# Patient Record
Sex: FEMALE | Race: WHITE | URBAN - METROPOLITAN AREA
[De-identification: names, ages, dates, MRNs, and addresses within clinical notes are randomized per-mention and may not be internally consistent; named-entity substitution may affect disease eponyms.]

---

## 2019-09-30 ENCOUNTER — APPOINTMENT (RX ONLY)
Dept: URBAN - METROPOLITAN AREA CLINIC 310 | Facility: CLINIC | Age: 39
Setting detail: DERMATOLOGY
End: 2019-09-30

## 2019-09-30 DIAGNOSIS — L259 CONTACT DERMATITIS AND OTHER ECZEMA, UNSPECIFIED CAUSE: ICD-10-CM

## 2019-09-30 PROBLEM — L23.9 ALLERGIC CONTACT DERMATITIS, UNSPECIFIED CAUSE: Status: ACTIVE | Noted: 2019-09-30

## 2019-09-30 PROCEDURE — ? PATCH TESTING

## 2019-09-30 PROCEDURE — 95044 PATCH/APPLICATION TESTS: CPT

## 2019-09-30 ASSESSMENT — SEVERITY ASSESSMENT: SEVERITY: MILD

## 2019-09-30 NOTE — PROCEDURE: PATCH TESTING
Post-Care Instructions: I reviewed with the patient in detail post-care instructions. Patient should not sweat, pick at, or get the patches wet for 48 hours. Reminded to wear old or dark clothing on Wednesday.
Consent: Verbal consent obtained, risks reviewed including but not limited to rash, itching, allergic reaction, systemic rash, remote possiblity of anaphylaxis to allergen.
Detail Level: None
Number Of Patches (Maximum Allowable Per Dos By Cms Is 90): 80

## 2019-09-30 NOTE — HPI: RASH (ALLERGIC CONTACT DERMATITIS)
How Severe Is Your Rash?: mild
Response To Previous Treatment?: topical treatments are ineffective
Is This A New Presentation, Or A Follow-Up?: Rash
Additional History: Pt was seen at [facility] for allergic contact dermatitis with Dr. Osborne and they referred her here for patch testing

## 2019-10-02 ENCOUNTER — APPOINTMENT (RX ONLY)
Dept: URBAN - METROPOLITAN AREA CLINIC 310 | Facility: CLINIC | Age: 39
Setting detail: DERMATOLOGY
End: 2019-10-02

## 2019-10-02 DIAGNOSIS — L259 CONTACT DERMATITIS AND OTHER ECZEMA, UNSPECIFIED CAUSE: ICD-10-CM

## 2019-10-02 PROBLEM — L23.9 ALLERGIC CONTACT DERMATITIS, UNSPECIFIED CAUSE: Status: ACTIVE | Noted: 2019-10-02

## 2019-10-02 PROCEDURE — ? OTHER

## 2019-10-02 PROCEDURE — ? NORTH AMERICAN 80 PATCH TEST READING

## 2019-10-02 PROCEDURE — ? PATCH TEST REMOVAL

## 2019-10-02 PROCEDURE — 99212 OFFICE O/P EST SF 10 MIN: CPT

## 2019-10-02 NOTE — PROCEDURE: OTHER
Other (Free Text): #	Allergen	Result\\n1	nickel sulfate hexahydrate	-\\n2	balsam of peru (myroxylon pereirae resin)	-\\n3	fragrance mix	+/-\\n4	quaternium 15	-\\n5	neomycin sulfate	-\\n6	budesonide	-\\n7	cobalt (II) chloride hexahydrate	-\\n8	g-ycva-wcisicaglrs formaldehyde resin	-\\n9	4-phenylenediamine base	-\\n10	potassium dichromate	-\\n11	carba mix	-\\n12	thiuram mix 	-\\n13	diazolidinyl urea (Germall® II)	-\\n14	paraben mix 	-\\n15	black rubber mix - PPD	-\\n16	imidazolidinyl urea (Germall® 115)	-\\n17	mercapto mix	-\\n18	tixocortol-21-pivalate	-\\n19	2-mercaptobenzothiazole	-\\n20	colophony	-\\n21	bisphenol A epoxy resin	-\\n22	ethylenediamine dihydrochloride	-\\n23	amerchol L101	-\\n24	benzocaine	-\\n25	bacitracin	-\\n26	DMDM hydantoin	-\\n27	Benzoic acid	-\\n28	Ethylhexyl glycerol	-\\n29	2-bromo-2-nitropropane-1,3-diol (bronopol™)	-\\n30	lidocaine-HCl	-\\n31	gold sodium thiosulfate	-\\n32	methyldibromo glutaronitrile (MDBGN)	-\\n33	disperse blue mix 	-\\n34	hydrocortisone-17-butyrate	-\\n35	fragrance mix II	1+\\n36	iodopropynyl butylcarbamate	-\\n37	mixed dialkyl thioureas	-\\n38	2-hydroxyethyl methacrylate (HEMA)	-\\n39	decyl glucoside	-\\n40	methyl methacrylate	-\\n41	cinnamic aldehyde	-\\n42	ethyl acrylate	-\\n43	tea tree oil, oxidized	1+\\n44	4-chloro-3,5-xylenol (PCMX)	-\\n45	propolis	-\\n46	2-hydroxy-4-methoxybenzophenone (oxybenzone)	-\\n47	tosylamide formaldehyde resin	-\\n48	sesquiterpenelactone mix	-\\n49	coconut diethanolamide (cocamide POLLY)	-\\n50	4-chloro-3-cresol ([facility])	-\\n51	benzalkonium chloride	-\\n52	benzophenone-4	-\\n53	Polymyxin B sulfate	-\\n54	sorbic acid	-\\n55	cananga odorata (suzan mares)	1+\\n56	compositae mix	-\\n57	ethyleneurea, melamine formaldehyde mix	-\\n58	sorbitan sesquioleate	-\\n59	1,3-diphenylguanidine (DPG)	-\\n60	cetylstearylalcohol	-\\n61	Sodium benzoate	-\\n62	triamcinolone acetonide	-\\n63	clobetasol-17-propionate	-\\n64	dl alpha tocopherol	-\\n65	ethyl cyanoacrylate	-\\n66	phenoxyethanol	-\\n67	disperse orange-3	-\\n68	Lavender oil (lavandula angustifolia oil)	+/-\\n69	butylhydroxytoluene (BHT)	-\\n70	2-ethylhexyl-4-methoxycinnamate (octinoxate)	-\\n71	benzyl alcohol	-\\n72	formaldehyde	-\\n73	methylchloroisothiazolinone/methylisothiazolinone	-\\n74	methylisothiazolinone	2+\\n75	cocamidopropyl betaine	-\\n76	dimethylaminopropylamine (DMAPA)	-\\n77	oleamidopropyl dimethylamine	-\\n78	propylene glycol	-\\n79	amidoamine (stearamidopropyl dimethylamine)	-\\n80	chlorhexidine digluconate	-\\n\\nSeveral early positives as noted above. We will await her final reading on Friday to discuss. Instructions for care of her patch test sites were given. She is to f/u in 2 days. \\n Other (Free Text): #	Allergen	Result\\n1	nickel sulfate hexahydrate	-\\n2	balsam of peru (myroxylon pereirae resin)	-\\n3	fragrance mix	+/-\\n4	quaternium 15	-\\n5	neomycin sulfate	-\\n6	budesonide	-\\n7	cobalt (II) chloride hexahydrate	-\\n8	r-kssy-pkiyuzrdiwa formaldehyde resin	-\\n9	4-phenylenediamine base	-\\n10	potassium dichromate	-\\n11	carba mix	-\\n12	thiuram mix 	-\\n13	diazolidinyl urea (Germall® II)	-\\n14	paraben mix 	-\\n15	black rubber mix - PPD	-\\n16	imidazolidinyl urea (Germall® 115)	-\\n17	mercapto mix	-\\n18	tixocortol-21-pivalate	-\\n19	2-mercaptobenzothiazole	-\\n20	colophony	-\\n21	bisphenol A epoxy resin	-\\n22	ethylenediamine dihydrochloride	-\\n23	amerchol L101	-\\n24	benzocaine	-\\n25	bacitracin	-\\n26	DMDM hydantoin	-\\n27	Benzoic acid	-\\n28	Ethylhexyl glycerol	-\\n29	2-bromo-2-nitropropane-1,3-diol (bronopol™)	-\\n30	lidocaine-HCl	-\\n31	gold sodium thiosulfate	-\\n32	methyldibromo glutaronitrile (MDBGN)	-\\n33	disperse blue mix 	-\\n34	hydrocortisone-17-butyrate	-\\n35	fragrance mix II	1+\\n36	iodopropynyl butylcarbamate	-\\n37	mixed dialkyl thioureas	-\\n38	2-hydroxyethyl methacrylate (HEMA)	-\\n39	decyl glucoside	-\\n40	methyl methacrylate	-\\n41	cinnamic aldehyde	-\\n42	ethyl acrylate	-\\n43	tea tree oil, oxidized	1+\\n44	4-chloro-3,5-xylenol (PCMX)	-\\n45	propolis	-\\n46	2-hydroxy-4-methoxybenzophenone (oxybenzone)	-\\n47	tosylamide formaldehyde resin	-\\n48	sesquiterpenelactone mix	-\\n49	coconut diethanolamide (cocamide POLLY)	-\\n50	4-chloro-3-cresol ([facility])	-\\n51	benzalkonium chloride	-\\n52	benzophenone-4	-\\n53	Polymyxin B sulfate	-\\n54	sorbic acid	-\\n55	cananga odorata (suzan mares)	1+\\n56	compositae mix	-\\n57	ethyleneurea, melamine formaldehyde mix	-\\n58	sorbitan sesquioleate	-\\n59	1,3-diphenylguanidine (DPG)	-\\n60	cetylstearylalcohol	-\\n61	Sodium benzoate	-\\n62	triamcinolone acetonide	-\\n63	clobetasol-17-propionate	-\\n64	dl alpha tocopherol	-\\n65	ethyl cyanoacrylate	-\\n66	phenoxyethanol	-\\n67	disperse orange-3	-\\n68	Lavender oil (lavandula angustifolia oil)	+/-\\n69	butylhydroxytoluene (BHT)	-\\n70	2-ethylhexyl-4-methoxycinnamate (octinoxate)	-\\n71	benzyl alcohol	-\\n72	formaldehyde	-\\n73	methylchloroisothiazolinone/methylisothiazolinone	-\\n74	methylisothiazolinone	2+\\n75	cocamidopropyl betaine	-\\n76	dimethylaminopropylamine (DMAPA)	-\\n77	oleamidopropyl dimethylamine	-\\n78	propylene glycol	-\\n79	amidoamine (stearamidopropyl dimethylamine)	-\\n80	chlorhexidine digluconate	-\\n\\nSeveral early positives as noted above. We will await her final reading on Friday to discuss. Instructions for care of her patch test sites were given. She is to f/u in 2 days. \\n

## 2019-10-02 NOTE — PROCEDURE: [employer] 80 PATCH TEST READING
Allergen 80 Reaction: no reaction
Show Allergen Counseling In The Note?: No
Show Negative Results In The Note?: Yes
Detail Level: Zone
Number Of Patches Read: 80
What Reading Time Point?: 48 hour

## 2019-10-02 NOTE — PROCEDURE: PATCH TEST REMOVAL
Detail Level: None
Patch Test Removal Text: The patch test material was removed from the back today. The first patch test reading was performed today.

## 2019-10-04 ENCOUNTER — APPOINTMENT (RX ONLY)
Dept: URBAN - METROPOLITAN AREA CLINIC 310 | Facility: CLINIC | Age: 39
Setting detail: DERMATOLOGY
End: 2019-10-04

## 2019-10-04 DIAGNOSIS — L259 CONTACT DERMATITIS AND OTHER ECZEMA, UNSPECIFIED CAUSE: ICD-10-CM

## 2019-10-04 PROBLEM — L23.9 ALLERGIC CONTACT DERMATITIS, UNSPECIFIED CAUSE: Status: ACTIVE | Noted: 2019-10-04

## 2019-10-04 PROCEDURE — 99213 OFFICE O/P EST LOW 20 MIN: CPT

## 2019-10-04 PROCEDURE — ? OTHER

## 2019-10-04 PROCEDURE — ? NORTH AMERICAN 80 PATCH TEST READING

## 2019-10-04 NOTE — PROCEDURE: [employer] 80 PATCH TEST READING
Allergen 9 Reaction: no reaction
What Reading Time Point?: 96 hour
Detail Level: Zone
Number Of Patches Read: 80
Show Negative Results In The Note?: Yes
Show Allergen Counseling In The Note?: No

## 2019-10-04 NOTE — PROCEDURE: OTHER
Other (Free Text): #	Allergen	Result\\n1	nickel sulfate hexahydrate	-\\n2	balsam of peru (myroxylon pereirae resin)	-\\n3	fragrance mix	+/-\\n4	quaternium 15	-\\n5	neomycin sulfate	-\\n6	budesonide	-\\n7	cobalt (II) chloride hexahydrate	-\\n8	y-ixxr-abkycqapjkp formaldehyde resin	-\\n9	4-phenylenediamine base	-\\n10	potassium dichromate	-\\n11	carba mix	-\\n12	thiuram mix 	-\\n13	diazolidinyl urea (Germall® II)	-\\n14	paraben mix 	-\\n15	black rubber mix - PPD	-\\n16	imidazolidinyl urea (Germall® 115)	-\\n17	mercapto mix	-\\n18	tixocortol-21-pivalate	-\\n19	2-mercaptobenzothiazole	-\\n20	colophony	-\\n21	bisphenol A epoxy resin	-\\n22	ethylenediamine dihydrochloride	-\\n23	amerchol L101	-\\n24	benzocaine	-\\n25	bacitracin	-\\n26	DMDM hydantoin	-\\n27	Benzoic acid	-\\n28	Ethylhexyl glycerol	-\\n29	2-bromo-2-nitropropane-1,3-diol (bronopol™)	-\\n30	lidocaine-HCl	-\\n31	gold sodium thiosulfate	-\\n32	methyldibromo glutaronitrile (MDBGN)	-\\n33	disperse blue mix 	-\\n34	hydrocortisone-17-butyrate	-\\n35	fragrance mix II	2+\\n36	iodopropynyl butylcarbamate	-\\n37	mixed dialkyl thioureas	-\\n38	2-hydroxyethyl methacrylate (HEMA)	-\\n39	decyl glucoside	-\\n40	methyl methacrylate	-\\n41	cinnamic aldehyde	-\\n42	ethyl acrylate	-\\n43	tea tree oil, oxidized	2+\\n44	4-chloro-3,5-xylenol (PCMX)	-\\n45	propolis	-\\n46	2-hydroxy-4-methoxybenzophenone (oxybenzone)	-\\n47	tosylamide formaldehyde resin	-\\n48	sesquiterpenelactone mix	-\\n49	coconut diethanolamide (cocamide POLLY)	-\\n50	4-chloro-3-cresol ([facility])	-\\n51	benzalkonium chloride	-\\n52	benzophenone-4	-\\n53	Polymyxin B sulfate	-\\n54	sorbic acid	-\\n55	cananga odorata (ylang ylang)	1+\\n56	compositae mix	-\\n57	ethyleneurea, melamine formaldehyde mix	-\\n58	sorbitan sesquioleate	-\\n59	1,3-diphenylguanidine (DPG)	-\\n60	cetylstearylalcohol	-\\n61	Sodium benzoate	-\\n62	triamcinolone acetonide	-\\n63	clobetasol-17-propionate	-\\n64	dl alpha tocopherol	-\\n65	ethyl cyanoacrylate	-\\n66	phenoxyethanol	-\\n67	disperse orange-3	-\\n68	Lavender oil (lavandula angustifolia oil)	1+\\n69	butylhydroxytoluene (BHT)	-\\n70	2-ethylhexyl-4-methoxycinnamate (octinoxate)	-\\n71	benzyl alcohol	-\\n72	formaldehyde	-\\n73	methylchloroisothiazolinone/methylisothiazolinone	1+\\n74	methylisothiazolinone	3+\\n75	cocamidopropyl betaine	-\\n76	dimethylaminopropylamine (DMAPA)	-\\n77	oleamidopropyl dimethylamine	-\\n78	propylene glycol	-\\n79	amidoamine (stearamidopropyl dimethylamine)	-\\n80	chlorhexidine digluconate	-\\n\\nShe is reacting to several fragrances including frag mix I and II, tea tree oil, ylang ylang oil, lavender oil. She does use some of the essential oils. Also she is reacting to MCI/MI. I am suspicious of all of these. They were discussed and handouts were given. She will start avoiding these. She is to f/u w/ Dr. Osborne in 8-12 weeks. Call me sooner prn questions or concerns. \\n Other (Free Text): #	Allergen	Result\\n1	nickel sulfate hexahydrate	-\\n2	balsam of peru (myroxylon pereirae resin)	-\\n3	fragrance mix	+/-\\n4	quaternium 15	-\\n5	neomycin sulfate	-\\n6	budesonide	-\\n7	cobalt (II) chloride hexahydrate	-\\n8	h-ozff-fpjsqftaxey formaldehyde resin	-\\n9	4-phenylenediamine base	-\\n10	potassium dichromate	-\\n11	carba mix	-\\n12	thiuram mix 	-\\n13	diazolidinyl urea (Germall® II)	-\\n14	paraben mix 	-\\n15	black rubber mix - PPD	-\\n16	imidazolidinyl urea (Germall® 115)	-\\n17	mercapto mix	-\\n18	tixocortol-21-pivalate	-\\n19	2-mercaptobenzothiazole	-\\n20	colophony	-\\n21	bisphenol A epoxy resin	-\\n22	ethylenediamine dihydrochloride	-\\n23	amerchol L101	-\\n24	benzocaine	-\\n25	bacitracin	-\\n26	DMDM hydantoin	-\\n27	Benzoic acid	-\\n28	Ethylhexyl glycerol	-\\n29	2-bromo-2-nitropropane-1,3-diol (bronopol™)	-\\n30	lidocaine-HCl	-\\n31	gold sodium thiosulfate	-\\n32	methyldibromo glutaronitrile (MDBGN)	-\\n33	disperse blue mix 	-\\n34	hydrocortisone-17-butyrate	-\\n35	fragrance mix II	2+\\n36	iodopropynyl butylcarbamate	-\\n37	mixed dialkyl thioureas	-\\n38	2-hydroxyethyl methacrylate (HEMA)	-\\n39	decyl glucoside	-\\n40	methyl methacrylate	-\\n41	cinnamic aldehyde	-\\n42	ethyl acrylate	-\\n43	tea tree oil, oxidized	2+\\n44	4-chloro-3,5-xylenol (PCMX)	-\\n45	propolis	-\\n46	2-hydroxy-4-methoxybenzophenone (oxybenzone)	-\\n47	tosylamide formaldehyde resin	-\\n48	sesquiterpenelactone mix	-\\n49	coconut diethanolamide (cocamide POLLY)	-\\n50	4-chloro-3-cresol ([facility])	-\\n51	benzalkonium chloride	-\\n52	benzophenone-4	-\\n53	Polymyxin B sulfate	-\\n54	sorbic acid	-\\n55	cananga odorata (ylang ylang)	1+\\n56	compositae mix	-\\n57	ethyleneurea, melamine formaldehyde mix	-\\n58	sorbitan sesquioleate	-\\n59	1,3-diphenylguanidine (DPG)	-\\n60	cetylstearylalcohol	-\\n61	Sodium benzoate	-\\n62	triamcinolone acetonide	-\\n63	clobetasol-17-propionate	-\\n64	dl alpha tocopherol	-\\n65	ethyl cyanoacrylate	-\\n66	phenoxyethanol	-\\n67	disperse orange-3	-\\n68	Lavender oil (lavandula angustifolia oil)	1+\\n69	butylhydroxytoluene (BHT)	-\\n70	2-ethylhexyl-4-methoxycinnamate (octinoxate)	-\\n71	benzyl alcohol	-\\n72	formaldehyde	-\\n73	methylchloroisothiazolinone/methylisothiazolinone	1+\\n74	methylisothiazolinone	3+\\n75	cocamidopropyl betaine	-\\n76	dimethylaminopropylamine (DMAPA)	-\\n77	oleamidopropyl dimethylamine	-\\n78	propylene glycol	-\\n79	amidoamine (stearamidopropyl dimethylamine)	-\\n80	chlorhexidine digluconate	-\\n\\nShe is reacting to several fragrances including frag mix I and II, tea tree oil, ylang ylang oil, lavender oil. She does use some of the essential oils. Also she is reacting to MCI/MI. I am suspicious of all of these. They were discussed and handouts were given. She will start avoiding these. She is to f/u w/ Dr. Osborne in 8-12 weeks. Call me sooner prn questions or concerns. \\n

## 2020-10-21 ENCOUNTER — APPOINTMENT (RX ONLY)
Dept: URBAN - METROPOLITAN AREA CLINIC 310 | Facility: CLINIC | Age: 40
Setting detail: DERMATOLOGY
End: 2020-10-21

## 2020-10-21 VITALS — TEMPERATURE: 98.4 F

## 2020-10-21 DIAGNOSIS — L71.0 PERIORAL DERMATITIS: ICD-10-CM

## 2020-10-21 PROCEDURE — 99213 OFFICE O/P EST LOW 20 MIN: CPT

## 2020-10-21 PROCEDURE — ? PRESCRIPTION

## 2020-10-21 PROCEDURE — ? COUNSELING

## 2020-10-21 RX ORDER — DOXYCYCLINE HYCLATE 100 MG/1
CAPSULE, GELATIN COATED ORAL BID
Qty: 60 | Refills: 0 | Status: ERX | COMMUNITY
Start: 2020-10-21

## 2020-10-21 RX ADMIN — DOXYCYCLINE HYCLATE: 100 CAPSULE, GELATIN COATED ORAL at 00:00

## 2020-10-21 ASSESSMENT — LOCATION ZONE DERM: LOCATION ZONE: FACE

## 2020-10-21 ASSESSMENT — LOCATION DETAILED DESCRIPTION DERM: LOCATION DETAILED: LEFT SUPERIOR CENTRAL BUCCAL CHEEK

## 2020-10-21 ASSESSMENT — LOCATION SIMPLE DESCRIPTION DERM: LOCATION SIMPLE: LEFT CHEEK

## 2020-10-21 NOTE — HPI: RASH
What Type Of Note Output Would You Prefer (Optional)?: Bullet Format
How Severe Is Your Rash?: moderate
Is This A New Presentation, Or A Follow-Up?: Rash
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 this started around mouth but now around eyes. never had that before. went to pcp. given metrogel 1%. only tried it about 4 times so far.\\n\\nallergic contact dermatitis under great control. this is different.

## 2020-10-21 NOTE — PROCEDURE: COUNSELING
Patient Specific Counseling (Will Not Stick From Patient To Patient): Handout given. Hold on sulfo lo soap due to her fragrance allergy. Will have her take dcn bid for one month and apply the metrogel she already has at home. Call me if not under control in 1 month.
Detail Level: Zone

## 2021-11-12 ENCOUNTER — APPOINTMENT (RX ONLY)
Dept: URBAN - METROPOLITAN AREA CLINIC 373 | Facility: CLINIC | Age: 41
Setting detail: DERMATOLOGY
End: 2021-11-12

## 2021-11-12 DIAGNOSIS — L72.8 OTHER FOLLICULAR CYSTS OF THE SKIN AND SUBCUTANEOUS TISSUE: ICD-10-CM

## 2021-11-12 DIAGNOSIS — L57.0 ACTINIC KERATOSIS: ICD-10-CM

## 2021-11-12 DIAGNOSIS — L82.1 OTHER SEBORRHEIC KERATOSIS: ICD-10-CM

## 2021-11-12 DIAGNOSIS — D22 MELANOCYTIC NEVI: ICD-10-CM

## 2021-11-12 DIAGNOSIS — L81.4 OTHER MELANIN HYPERPIGMENTATION: ICD-10-CM

## 2021-11-12 DIAGNOSIS — D17 BENIGN LIPOMATOUS NEOPLASM: ICD-10-CM

## 2021-11-12 DIAGNOSIS — D18.0 HEMANGIOMA: ICD-10-CM

## 2021-11-12 PROBLEM — D17.1 BENIGN LIPOMATOUS NEOPLASM OF SKIN AND SUBCUTANEOUS TISSUE OF TRUNK: Status: ACTIVE | Noted: 2021-11-12

## 2021-11-12 PROBLEM — D18.01 HEMANGIOMA OF SKIN AND SUBCUTANEOUS TISSUE: Status: ACTIVE | Noted: 2021-11-12

## 2021-11-12 PROBLEM — D22.5 MELANOCYTIC NEVI OF TRUNK: Status: ACTIVE | Noted: 2021-11-12

## 2021-11-12 PROCEDURE — ? FULL BODY SKIN EXAM

## 2021-11-12 PROCEDURE — ? COUNSELING

## 2021-11-12 PROCEDURE — ? ADDITIONAL NOTES

## 2021-11-12 PROCEDURE — ? TREATMENT REGIMEN

## 2021-11-12 PROCEDURE — 99213 OFFICE O/P EST LOW 20 MIN: CPT

## 2021-11-12 ASSESSMENT — LOCATION DETAILED DESCRIPTION DERM
LOCATION DETAILED: RIGHT VENTRAL PROXIMAL FOREARM
LOCATION DETAILED: NASAL ROOT
LOCATION DETAILED: LEFT MID-UPPER BACK
LOCATION DETAILED: EPIGASTRIC SKIN
LOCATION DETAILED: MID TRAPEZIAL NECK
LOCATION DETAILED: RIGHT MID-UPPER BACK

## 2021-11-12 ASSESSMENT — LOCATION SIMPLE DESCRIPTION DERM
LOCATION SIMPLE: RIGHT FOREARM
LOCATION SIMPLE: ABDOMEN
LOCATION SIMPLE: NOSE
LOCATION SIMPLE: RIGHT UPPER BACK
LOCATION SIMPLE: TRAPEZIAL NECK
LOCATION SIMPLE: LEFT UPPER BACK

## 2021-11-12 ASSESSMENT — LOCATION ZONE DERM
LOCATION ZONE: TRUNK
LOCATION ZONE: NECK
LOCATION ZONE: ARM
LOCATION ZONE: NOSE

## 2021-11-12 NOTE — PROCEDURE: COUNSELING
Detail Level: Detailed
Patient Specific Counseling (Will Not Stick From Patient To Patient): Recommended liquid nitrogen or chemo cream to treat. Patient will consider and call back
Detail Level: Simple
Detail Level: Zone

## 2023-01-12 ENCOUNTER — APPOINTMENT (RX ONLY)
Dept: URBAN - METROPOLITAN AREA CLINIC 373 | Facility: CLINIC | Age: 43
Setting detail: DERMATOLOGY
End: 2023-01-12

## 2023-01-12 DIAGNOSIS — D22 MELANOCYTIC NEVI: ICD-10-CM

## 2023-01-12 DIAGNOSIS — L82.1 OTHER SEBORRHEIC KERATOSIS: ICD-10-CM

## 2023-01-12 DIAGNOSIS — L81.4 OTHER MELANIN HYPERPIGMENTATION: ICD-10-CM

## 2023-01-12 DIAGNOSIS — D18.0 HEMANGIOMA: ICD-10-CM

## 2023-01-12 PROBLEM — D18.01 HEMANGIOMA OF SKIN AND SUBCUTANEOUS TISSUE: Status: ACTIVE | Noted: 2023-01-12

## 2023-01-12 PROBLEM — D22.5 MELANOCYTIC NEVI OF TRUNK: Status: ACTIVE | Noted: 2023-01-12

## 2023-01-12 PROCEDURE — 99213 OFFICE O/P EST LOW 20 MIN: CPT

## 2023-01-12 PROCEDURE — ? TREATMENT REGIMEN

## 2023-01-12 PROCEDURE — ? FULL BODY SKIN EXAM

## 2023-01-12 PROCEDURE — ? ADDITIONAL NOTES

## 2023-01-12 PROCEDURE — ? COUNSELING

## 2023-01-12 ASSESSMENT — LOCATION ZONE DERM
LOCATION ZONE: TRUNK
LOCATION ZONE: FACE
LOCATION ZONE: ARM

## 2023-01-12 ASSESSMENT — LOCATION SIMPLE DESCRIPTION DERM
LOCATION SIMPLE: LEFT UPPER BACK
LOCATION SIMPLE: ABDOMEN
LOCATION SIMPLE: RIGHT CHEEK
LOCATION SIMPLE: CHEST
LOCATION SIMPLE: RIGHT UPPER BACK
LOCATION SIMPLE: RIGHT FOREARM

## 2023-01-12 ASSESSMENT — LOCATION DETAILED DESCRIPTION DERM
LOCATION DETAILED: EPIGASTRIC SKIN
LOCATION DETAILED: RIGHT VENTRAL PROXIMAL FOREARM
LOCATION DETAILED: LEFT MID-UPPER BACK
LOCATION DETAILED: RIGHT INFERIOR CENTRAL MALAR CHEEK
LOCATION DETAILED: RIGHT MID-UPPER BACK
LOCATION DETAILED: RIGHT MEDIAL SUPERIOR CHEST

## 2024-01-18 ENCOUNTER — APPOINTMENT (RX ONLY)
Dept: URBAN - METROPOLITAN AREA CLINIC 373 | Facility: CLINIC | Age: 44
Setting detail: DERMATOLOGY
End: 2024-01-18

## 2024-01-18 DIAGNOSIS — L82.1 OTHER SEBORRHEIC KERATOSIS: ICD-10-CM

## 2024-01-18 DIAGNOSIS — D18.0 HEMANGIOMA: ICD-10-CM

## 2024-01-18 DIAGNOSIS — D22 MELANOCYTIC NEVI: ICD-10-CM

## 2024-01-18 DIAGNOSIS — L81.4 OTHER MELANIN HYPERPIGMENTATION: ICD-10-CM

## 2024-01-18 DIAGNOSIS — D17 BENIGN LIPOMATOUS NEOPLASM: ICD-10-CM

## 2024-01-18 DIAGNOSIS — L81.3 CAFÉ AU LAIT SPOTS: ICD-10-CM

## 2024-01-18 PROBLEM — D17.1 BENIGN LIPOMATOUS NEOPLASM OF SKIN AND SUBCUTANEOUS TISSUE OF TRUNK: Status: ACTIVE | Noted: 2024-01-18

## 2024-01-18 PROBLEM — D22.5 MELANOCYTIC NEVI OF TRUNK: Status: ACTIVE | Noted: 2024-01-18

## 2024-01-18 PROBLEM — D18.01 HEMANGIOMA OF SKIN AND SUBCUTANEOUS TISSUE: Status: ACTIVE | Noted: 2024-01-18

## 2024-01-18 PROCEDURE — ? FULL BODY SKIN EXAM

## 2024-01-18 PROCEDURE — 99213 OFFICE O/P EST LOW 20 MIN: CPT

## 2024-01-18 PROCEDURE — ? ADDITIONAL NOTES

## 2024-01-18 PROCEDURE — ? TREATMENT REGIMEN

## 2024-01-18 PROCEDURE — ? COUNSELING

## 2024-01-18 ASSESSMENT — LOCATION DETAILED DESCRIPTION DERM
LOCATION DETAILED: RIGHT SUPERIOR LATERAL UPPER BACK
LOCATION DETAILED: RIGHT VENTRAL PROXIMAL FOREARM
LOCATION DETAILED: RIGHT SUPERIOR LATERAL MIDBACK
LOCATION DETAILED: RIGHT MEDIAL SUPERIOR CHEST
LOCATION DETAILED: EPIGASTRIC SKIN
LOCATION DETAILED: RIGHT MID-UPPER BACK
LOCATION DETAILED: RIGHT INFERIOR CENTRAL MALAR CHEEK
LOCATION DETAILED: LEFT MID-UPPER BACK

## 2024-01-18 ASSESSMENT — LOCATION SIMPLE DESCRIPTION DERM
LOCATION SIMPLE: LEFT UPPER BACK
LOCATION SIMPLE: RIGHT UPPER BACK
LOCATION SIMPLE: RIGHT LOWER BACK
LOCATION SIMPLE: RIGHT FOREARM
LOCATION SIMPLE: ABDOMEN
LOCATION SIMPLE: RIGHT BACK
LOCATION SIMPLE: RIGHT CHEEK
LOCATION SIMPLE: CHEST

## 2024-01-18 ASSESSMENT — LOCATION ZONE DERM
LOCATION ZONE: TRUNK
LOCATION ZONE: ARM
LOCATION ZONE: FACE

## 2025-02-20 ENCOUNTER — APPOINTMENT (OUTPATIENT)
Dept: URBAN - METROPOLITAN AREA CLINIC 373 | Facility: CLINIC | Age: 45
Setting detail: DERMATOLOGY
End: 2025-02-20

## 2025-02-20 DIAGNOSIS — L81.4 OTHER MELANIN HYPERPIGMENTATION: ICD-10-CM | Status: STABLE

## 2025-02-20 DIAGNOSIS — D22 MELANOCYTIC NEVI: ICD-10-CM | Status: STABLE

## 2025-02-20 DIAGNOSIS — D18.0 HEMANGIOMA: ICD-10-CM | Status: STABLE

## 2025-02-20 DIAGNOSIS — L81.3 CAFÉ AU LAIT SPOTS: ICD-10-CM | Status: STABLE

## 2025-02-20 DIAGNOSIS — D17 BENIGN LIPOMATOUS NEOPLASM: ICD-10-CM | Status: STABLE

## 2025-02-20 DIAGNOSIS — L71.8 OTHER ROSACEA: ICD-10-CM

## 2025-02-20 DIAGNOSIS — L82.1 OTHER SEBORRHEIC KERATOSIS: ICD-10-CM | Status: STABLE

## 2025-02-20 PROBLEM — D18.01 HEMANGIOMA OF SKIN AND SUBCUTANEOUS TISSUE: Status: ACTIVE | Noted: 2025-02-20

## 2025-02-20 PROBLEM — D22.5 MELANOCYTIC NEVI OF TRUNK: Status: ACTIVE | Noted: 2025-02-20

## 2025-02-20 PROBLEM — D17.1 BENIGN LIPOMATOUS NEOPLASM OF SKIN AND SUBCUTANEOUS TISSUE OF TRUNK: Status: ACTIVE | Noted: 2025-02-20

## 2025-02-20 PROCEDURE — ? COUNSELING

## 2025-02-20 PROCEDURE — 99213 OFFICE O/P EST LOW 20 MIN: CPT

## 2025-02-20 PROCEDURE — ? TREATMENT REGIMEN

## 2025-02-20 PROCEDURE — ? FULL BODY SKIN EXAM

## 2025-02-20 ASSESSMENT — LOCATION SIMPLE DESCRIPTION DERM
LOCATION SIMPLE: RIGHT CHEEK
LOCATION SIMPLE: CHEST
LOCATION SIMPLE: RIGHT UPPER BACK
LOCATION SIMPLE: ABDOMEN
LOCATION SIMPLE: RIGHT FOREARM
LOCATION SIMPLE: LEFT UPPER BACK
LOCATION SIMPLE: RIGHT POSTERIOR THIGH
LOCATION SIMPLE: LEFT CHEEK

## 2025-02-20 ASSESSMENT — LOCATION DETAILED DESCRIPTION DERM
LOCATION DETAILED: RIGHT VENTRAL PROXIMAL FOREARM
LOCATION DETAILED: EPIGASTRIC SKIN
LOCATION DETAILED: RIGHT MID-UPPER BACK
LOCATION DETAILED: RIGHT PROXIMAL POSTERIOR THIGH
LOCATION DETAILED: RIGHT INFERIOR CENTRAL MALAR CHEEK
LOCATION DETAILED: RIGHT MEDIAL SUPERIOR CHEST
LOCATION DETAILED: LEFT MEDIAL MALAR CHEEK
LOCATION DETAILED: LEFT MID-UPPER BACK

## 2025-02-20 ASSESSMENT — LOCATION ZONE DERM
LOCATION ZONE: FACE
LOCATION ZONE: ARM
LOCATION ZONE: LEG
LOCATION ZONE: TRUNK